# Patient Record
Sex: FEMALE | URBAN - METROPOLITAN AREA
[De-identification: names, ages, dates, MRNs, and addresses within clinical notes are randomized per-mention and may not be internally consistent; named-entity substitution may affect disease eponyms.]

---

## 2023-12-08 ENCOUNTER — TELEMEDICINE (OUTPATIENT)
Dept: PRIMARY CARE | Facility: CLINIC | Age: 83
End: 2023-12-08

## 2023-12-08 DIAGNOSIS — R68.89 FLU-LIKE SYMPTOMS: Primary | ICD-10-CM

## 2023-12-08 PROCEDURE — 99212 OFFICE O/P EST SF 10 MIN: CPT | Performed by: FAMILY MEDICINE

## 2023-12-08 RX ORDER — BENZONATATE 200 MG/1
200 CAPSULE ORAL 3 TIMES DAILY PRN
Qty: 42 CAPSULE | Refills: 0 | Status: SHIPPED | OUTPATIENT
Start: 2023-12-08 | End: 2024-01-07

## 2023-12-08 NOTE — PROGRESS NOTES
Chief Complaint: URI sxs  HPI: woke up Monday very sick-(today is Friday)- (-) COVID test-- did COVID test Monday---  Aches and fever Monday night  Coughing  Aches and fever and coughing  Bad cough causing rib and back pain  Aches resolved- HA--RN-can feel fluid in face  The cough is improving except at night    Fever--yes  Chills--yes  Aches--yes  Cough--yes-dry  Exhaustion--tired  Sob or wheeze--no  Chest tightness--no  Sore throat--no  Congestion--yes-- started Wednesday--  RN/stuffy nose/PND--yes x2 day  Headache--yes--days 1 and 2  Nausea--monday  Vomiting--no  Diarrhea--no  Appetite--eating ok but decreased appetite  Fluids--good  Exposures--none known    OTC meds--aleve for aches    VAXXED ? Yes  Prior hx of COVID infection? No    ALL OTHER ROS (-)    General appearance:  Vitals available from patient?No  Alert, oriented, pleasant, in no apparent distress Yes  Answers questions appropriatelyYes  Eyes clear?Yes  Is patient in respiratory distressNo    Throat exam Not Available  Is patient coughing during consult:No  Audible wheezing noted? :No  Pt sounds congested?: Yes  Sniffing or rhinorrhea?: Yes  Psychiatric: Affect normalYes  Other relevant physical exam:      Pt location in [place] and consent obtained. Telemedicine appropriate evaluation completed. Appropriate physical exam done.    Viral flu like sxs  Recommend covid testing  Your symptoms are consistent with a viral upper respiratory infection.  At this point, no antibiotics are needed and they will not help you feel better any sooner.    If it has not been 7-10 days of symptoms, antibiotics are not helpful and are not prescribed.  (See below)  If symptoms are WORSE by days #7 or you have NO IMPROVEMENT in symptoms by day #10, you can reach out to my through my chart and we can discuss antibiotics.  I have also included information on over the counter medications that may be helpful.

## 2023-12-09 NOTE — PATIENT INSTRUCTIONS
Please send me a [facility] message if you have any questions or concerns.  FOR NON URGENT questions only.  Allow up to 72 hours for response.    If you have prescription issues or other questions you can email   Faraz Alvarez,  Digital Health Coordinator, at   [email or web address]     Viral flu like sxs  Recommend covid testing  Your symptoms are consistent with a viral upper respiratory infection.  At this point, no antibiotics are needed and they will not help you feel better any sooner.    If it has not been 7-10 days of symptoms, antibiotics are not helpful and are not prescribed.  (See below)  If symptoms are WORSE by days #7 or you have NO IMPROVEMENT in symptoms by day #10, you can reach out to my through my chart and we can discuss antibiotics.  I have also included information on over the counter medications that may be helpful.           Rest and drink plenty of fluids    Tylenol and or motrin as needed for pain and fever (unless you have been told not to take these because of your personal medical history)    Discussed options and precautions:   Viral versus bacterial infection; use of medications; possible side effects; appropriate over-the-counter medications; possible complications and /or when to follow-up.    Follow-up in 1 to 2 days if not improving.  Follow-up immediately if symptoms worsen.    All red flags requiring in person care were discussed.  All patient's questions were answered.    Limitations to telemedicine include inability to do a complete and accurate physical exam.  Any concerns regarding this were conveyed with the patient and in person follow-up recommended if patient nature of illness does not progress as anticipated during this visit.